# Patient Record
Sex: MALE | HISPANIC OR LATINO
[De-identification: names, ages, dates, MRNs, and addresses within clinical notes are randomized per-mention and may not be internally consistent; named-entity substitution may affect disease eponyms.]

---

## 2023-04-04 PROBLEM — Z00.00 ENCOUNTER FOR PREVENTIVE HEALTH EXAMINATION: Status: ACTIVE | Noted: 2023-04-04

## 2023-07-27 ENCOUNTER — APPOINTMENT (OUTPATIENT)
Dept: NEUROLOGY | Facility: CLINIC | Age: 28
End: 2023-07-27
Payer: COMMERCIAL

## 2023-07-27 VITALS
DIASTOLIC BLOOD PRESSURE: 70 MMHG | SYSTOLIC BLOOD PRESSURE: 110 MMHG | BODY MASS INDEX: 23.14 KG/M2 | HEIGHT: 76 IN | WEIGHT: 190 LBS

## 2023-07-27 DIAGNOSIS — G44.89 OTHER HEADACHE SYNDROME: ICD-10-CM

## 2023-07-27 DIAGNOSIS — Z78.9 OTHER SPECIFIED HEALTH STATUS: ICD-10-CM

## 2023-07-27 PROCEDURE — 99204 OFFICE O/P NEW MOD 45 MIN: CPT

## 2023-07-27 NOTE — ASSESSMENT
[FreeTextEntry1] : Paroxysmal hemicranial headaches\par His neurological examination is entirely normal\par We will check an MRI and MRA of the brain\par Further discussions to follow

## 2023-07-27 NOTE — HISTORY OF PRESENT ILLNESS
[FreeTextEntry1] : He reports headache episodes going on for about the last 2 years.\par They occur typically once every 2 months.  He gets pain on the left side of his head, eye, and face.  He describes it as a pressure.  There is no nausea, vomiting, or visual disturbance.  There is light sensitivity.  It will go on for a few hours.  Generally is mild to moderate severity.  He does not take any medication for them.  There is no weakness or numbness of the arms or legs.\par \par Medical history otherwise unremarkable\par \par No tobacco or alcohol use.  Works as an  for the [employer]\par \par Father has cluster headaches